# Patient Record
Sex: MALE | Race: BLACK OR AFRICAN AMERICAN | NOT HISPANIC OR LATINO | Employment: UNEMPLOYED | URBAN - METROPOLITAN AREA
[De-identification: names, ages, dates, MRNs, and addresses within clinical notes are randomized per-mention and may not be internally consistent; named-entity substitution may affect disease eponyms.]

---

## 2024-03-25 ENCOUNTER — OFFICE VISIT (OUTPATIENT)
Dept: FAMILY MEDICINE | Facility: CLINIC | Age: 29
End: 2024-03-25
Payer: COMMERCIAL

## 2024-03-25 VITALS
HEIGHT: 71 IN | HEART RATE: 76 BPM | DIASTOLIC BLOOD PRESSURE: 72 MMHG | WEIGHT: 167 LBS | SYSTOLIC BLOOD PRESSURE: 108 MMHG | BODY MASS INDEX: 23.38 KG/M2 | RESPIRATION RATE: 18 BRPM | OXYGEN SATURATION: 99 % | TEMPERATURE: 97.3 F

## 2024-03-25 DIAGNOSIS — M25.512 CHRONIC LEFT SHOULDER PAIN: ICD-10-CM

## 2024-03-25 DIAGNOSIS — G89.29 CHRONIC LEFT SHOULDER PAIN: ICD-10-CM

## 2024-03-25 DIAGNOSIS — M25.561 CHRONIC PAIN OF RIGHT KNEE: ICD-10-CM

## 2024-03-25 DIAGNOSIS — G89.29 CHRONIC PAIN OF RIGHT KNEE: ICD-10-CM

## 2024-03-25 DIAGNOSIS — L72.9 SKIN CYST: ICD-10-CM

## 2024-03-25 DIAGNOSIS — Z00.00 ROUTINE GENERAL MEDICAL EXAMINATION AT A HEALTH CARE FACILITY: Primary | ICD-10-CM

## 2024-03-25 LAB
ALBUMIN SERPL BCG-MCNC: 5 G/DL (ref 3.5–5.2)
ALP SERPL-CCNC: 73 U/L (ref 40–150)
ALT SERPL W P-5'-P-CCNC: 26 U/L (ref 0–70)
ANION GAP SERPL CALCULATED.3IONS-SCNC: 11 MMOL/L (ref 7–15)
AST SERPL W P-5'-P-CCNC: 31 U/L (ref 0–45)
BILIRUB SERPL-MCNC: 0.3 MG/DL
BUN SERPL-MCNC: 6.7 MG/DL (ref 6–20)
CALCIUM SERPL-MCNC: 9.8 MG/DL (ref 8.6–10)
CHLORIDE SERPL-SCNC: 101 MMOL/L (ref 98–107)
CHOLEST SERPL-MCNC: 192 MG/DL
CREAT SERPL-MCNC: 1.07 MG/DL (ref 0.67–1.17)
DEPRECATED HCO3 PLAS-SCNC: 27 MMOL/L (ref 22–29)
EGFRCR SERPLBLD CKD-EPI 2021: >90 ML/MIN/1.73M2
ERYTHROCYTE [DISTWIDTH] IN BLOOD BY AUTOMATED COUNT: 12.8 % (ref 10–15)
FASTING STATUS PATIENT QL REPORTED: YES
GLUCOSE SERPL-MCNC: 98 MG/DL (ref 70–99)
HBA1C MFR BLD: 5.3 % (ref 0–5.6)
HCT VFR BLD AUTO: 51.4 % (ref 40–53)
HDLC SERPL-MCNC: 66 MG/DL
HGB BLD-MCNC: 17 G/DL (ref 13.3–17.7)
LDLC SERPL CALC-MCNC: 108 MG/DL
MCH RBC QN AUTO: 29.5 PG (ref 26.5–33)
MCHC RBC AUTO-ENTMCNC: 33.1 G/DL (ref 31.5–36.5)
MCV RBC AUTO: 89 FL (ref 78–100)
NONHDLC SERPL-MCNC: 126 MG/DL
PLATELET # BLD AUTO: 194 10E3/UL (ref 150–450)
POTASSIUM SERPL-SCNC: 4.4 MMOL/L (ref 3.4–5.3)
PROT SERPL-MCNC: 7.7 G/DL (ref 6.4–8.3)
RBC # BLD AUTO: 5.77 10E6/UL (ref 4.4–5.9)
SODIUM SERPL-SCNC: 139 MMOL/L (ref 135–145)
TRIGL SERPL-MCNC: 92 MG/DL
TSH SERPL DL<=0.005 MIU/L-ACNC: 3.25 UIU/ML (ref 0.3–4.2)
VIT D+METAB SERPL-MCNC: 21 NG/ML (ref 20–50)
WBC # BLD AUTO: 6.5 10E3/UL (ref 4–11)

## 2024-03-25 PROCEDURE — 83036 HEMOGLOBIN GLYCOSYLATED A1C: CPT | Performed by: PHYSICIAN ASSISTANT

## 2024-03-25 PROCEDURE — 99385 PREV VISIT NEW AGE 18-39: CPT | Performed by: PHYSICIAN ASSISTANT

## 2024-03-25 PROCEDURE — 80053 COMPREHEN METABOLIC PANEL: CPT | Performed by: PHYSICIAN ASSISTANT

## 2024-03-25 PROCEDURE — 85027 COMPLETE CBC AUTOMATED: CPT | Performed by: PHYSICIAN ASSISTANT

## 2024-03-25 PROCEDURE — 99213 OFFICE O/P EST LOW 20 MIN: CPT | Mod: 25 | Performed by: PHYSICIAN ASSISTANT

## 2024-03-25 PROCEDURE — 80061 LIPID PANEL: CPT | Performed by: PHYSICIAN ASSISTANT

## 2024-03-25 PROCEDURE — 82306 VITAMIN D 25 HYDROXY: CPT | Performed by: PHYSICIAN ASSISTANT

## 2024-03-25 PROCEDURE — 36415 COLL VENOUS BLD VENIPUNCTURE: CPT | Performed by: PHYSICIAN ASSISTANT

## 2024-03-25 PROCEDURE — 84443 ASSAY THYROID STIM HORMONE: CPT | Performed by: PHYSICIAN ASSISTANT

## 2024-03-25 SDOH — HEALTH STABILITY: PHYSICAL HEALTH: ON AVERAGE, HOW MANY DAYS PER WEEK DO YOU ENGAGE IN MODERATE TO STRENUOUS EXERCISE (LIKE A BRISK WALK)?: 5 DAYS

## 2024-03-25 ASSESSMENT — SOCIAL DETERMINANTS OF HEALTH (SDOH): HOW OFTEN DO YOU GET TOGETHER WITH FRIENDS OR RELATIVES?: MORE THAN THREE TIMES A WEEK

## 2024-03-25 NOTE — COMMUNITY RESOURCES LIST (ENGLISH)
March 25, 2024           YOUR PERSONALIZED LIST OF SERVICES & PROGRAMS           & SHELTER    Housing      Free - Client Services  [address] [place] [address] (Distance: 16.8 miles)  Phone: [phone]) [phone]  Website: https://Common [employer]/  Language: English  Fee: Free  Transportation Options: Free transportation      [facility] [facility] half-way  Phone: [phone]) [phone]  Website: https://www.Epos.org/  Language: English      [facility] - Housing Stabilization Services  Phone: (362) 582-3424  Website: https://Genius/Housing-Stabilization.html  Language: English  Hours: Mon 9:00 AM - 5:00 PM Tue 9:00 AM - 5:00 PM Wed 9:00 AM - 5:00 PM Thu 9:00 AM - 5:00 PM Fri 9:00 AM - 5:00 PM  Fee: Insurance  Accessibility: Deaf or hard of hearing, Translation services    Case Management      Living - Housing Stabilization Services  [address] [place] [address] (Distance: 10.7 miles)  Phone: [phone]  Website: https://[employer]  Language: Yi, English, Cuban  Fee: Insurance, Self pay      [employer] - Housing Stabilization Services  Phone: (249) 836-7864  Website: https://homebasemn.com/  Language: English  Hours: Mon 8:00 AM - 4:00 PM Tue 8:00 AM - 4:00 PM Wed 8:00 AM - 4:00 PM Thu 8:00 AM - 4:00 PM Fri 8:00 AM - 4:00 PM  Fee: Free  Accessibility: Blind accommodation, Deaf or hard of hearing  Transportation Options: Free transportation      [employer] [facility] [employer] - Integrated Community Support Services (ICS) and Individualized Home Support (IHS)  Phone: (446) 673-3932  Website: https://www.[employer].org/  Language: Yi, English, Turkmen, Hmong, Cuban, Papua [place] Guinean  Hours: Mon 8:00 AM - 5:00 PM Tue 8:00 AM - 5:00 PM Wed 8:00 AM - 5:00 PM Thu 8:00 AM - 5:00 PM Fri 8:00 AM - 5:00 PM  Fee: Insurance  Accessibility: Blind accommodation, Deaf or hard of hearing, Translation services    Drop-In Services      [place] [address] (Distance: 2.2 miles)  Website: https://www.[employer].org/housing  Language: English  Fee: Free, Self pay      [facility] Library - Warming or cooling center - [facility] - [address]  [place] [address] (Distance: 1.8 miles)  Phone: [phone]) [phone]  Language: English, Turkmen, [place], Canadian, Central African, Estonian  Fee: Free      [facility]OVE - LAUNDRY LOVE  Website: http://www.laundrylove.org               IMPORTANT NUMBERS & WEBSITES        Emergency Services  911  .   United Way  211 http://211unitedway.org  .   Poison Control  (905) 992-8744 http://[place]poison.org http://wis[place]poison.org  .     Suicide and Crisis Lifeline  988 http://988Puddleline.org  .   Childhelp [place] Child Abuse Hotline  384.246.5944 http://Childhelphotline.org   .   [place] Sexual Assault Hotline  (901) 717-6188 (HOPE) http://TyRx Pharman.org   .     National Runaway Safeline  (967) 282-4781 (RUNAWAY) http://NellOne TherapeuticsruSafeBoot.org  .   Pregnancy & Postpartum Support  Call/text 550-957-8813  [place]: http://ppsupport[place].org  [place]: http://UsabilityTools.com.com/wi  .   Substance Abuse National Helpline ([facility])  898-438-HELP (3044) http://Findtreatment.gov   .                DISCLAIMER: [employer] does not endorse any service providers mentioned in this resource list. [employer] does not guarantee that the services mentioned in this resource list will be available to you or will improve your health or wellness.    [facility]

## 2024-03-25 NOTE — PROGRESS NOTES
Preventive Care Visit  [facility] PA-C, Internal Medicine  Mar 25, 2024      Assessment & Plan       ICD-10-CM    1. Routine general medical examination at a health care facility  Z00.00 Hemoglobin A1c     Comprehensive metabolic panel     CBC with platelets     Lipid panel reflex to direct LDL Non-fasting     Vitamin D Deficiency     TSH with free T4 reflex     Hemoglobin A1c     Comprehensive metabolic panel     CBC with platelets     Lipid panel reflex to direct LDL Non-fasting     Vitamin D Deficiency     TSH with free T4 reflex      2. Chronic pain of right knee  M25.561 Orthopedic  Referral    G89.29       3. Chronic left shoulder pain  M25.512 Physical Therapy  Referral    G89.29       4. Skin cyst  L72.9         - Knee exam with evidence of ligamentous derangement, referral placed to orthopedics for further evaluation.  - Suspect MSK strain causing shoulder pain, referral placed to PT for further evaluation.  - Skin cyst of mid upper back. Reassured patient of benign condition. Elects to continue watchful waiting.      Counseling  Appropriate preventive services were discussed with this patient, including applicable screening as appropriate for fall prevention, nutrition, physical activity, Tobacco-use cessation, weight loss and cognition.  Checklist reviewing preventive services available has been given to the patient.  Reviewed patient's diet, addressing concerns and/or questions.           Jean Rincon is a 28 year old, presenting for the following:  Physical        3/25/2024     2:05 PM   Additional Questions   Roomed by Haleigh ALLEN        Health Care Directive  Patient does not have a Health Care Directive or Living Will:     HPI    -C/o Lt posterior shoulder pain x5 months. Another player pulled his shoulder backward while playing soccer, resulting in ongoing pain. He usually feels it with external rotation and full abduction or  "adduction.    -Hurt his Rt knee about 5 years ago while playing soccer. Felt like his knee slipped out of place. He felt immediate pain. Was seen at outside clinic, XR was checked and was normal per patient; they recommended \"massage\" to treat the injury. Since then notes ongoing pain on lateral and medial aspects of knee when walking, in full flexion, or crossing Rt leg over Lt.    - Has skin lesion on upper mid back he would like checked.          3/25/2024   General Health   How would you rate your overall physical health? (!) FAIR   Feel stress (tense, anxious, or unable to sleep) Not at all         3/25/2024   Nutrition   Three or more servings of calcium each day? Yes   Diet: Regular (no restrictions)   How many servings of fruit and vegetables per day? (!) 2-3   How many sweetened beverages each day? (!) 2         3/25/2024   Exercise   Days per week of moderate/strenous exercise 5 days         3/25/2024   Social Factors   Frequency of gathering with friends or relatives More than three times a week   Worry food won't last until get money to buy more No   Food not last or not have enough money for food? No   Do you have housing?  No   Are you worried about losing your housing? No   Lack of transportation? No   Unable to get utilities (heat,electricity)? No   Want help with housing or utility concern? No   (!) HOUSING CONCERN PRESENT      3/25/2024   Dental   Dentist two times every year? Yes         3/25/2024   TB Screening   Were you born outside of the [place]? Yes         Today's PHQ-2 Score:       3/25/2024     1:48 PM   PHQ-2 ( 1999 [employer])   Q1: Little interest or pleasure in doing things 0   Q2: Feeling down, depressed or hopeless 0   PHQ-2 Score 0   Q1: Little interest or pleasure in doing things Not at all   Q2: Feeling down, depressed or hopeless Not at all   PHQ-2 Score 0           3/25/2024   Substance Use   Alcohol more than 3/day or more than 7/wk Not Applicable   Do you use any other substances " "recreationally? No     Social History     Tobacco Use    Smoking status: Never    Smokeless tobacco: Never           3/25/2024   STI Screening   New sexual partner(s) since last STI/HIV test? No         3/25/2024   Contraception/Family Planning   Questions about contraception or family planning No        Reviewed and updated as needed this visit by Provider                          Review of Systems  Constitutional, HEENT, cardiovascular, pulmonary, GI, , musculoskeletal, neuro, skin, endocrine and psych systems are negative, except as otherwise noted.     Objective    Exam  /72   Pulse 76   Temp 97.3  F (36.3  C)   Resp 18   Ht 1.803 m (5' 11\")   Wt 75.8 kg (167 lb)   SpO2 99%   BMI 23.29 kg/m     Estimated body mass index is 23.29 kg/m  as calculated from the following:    Height as of this encounter: 1.803 m (5' 11\").    Weight as of this encounter: 75.8 kg (167 lb).    Physical Exam  Musculoskeletal:      Right knee: No effusion.      Instability Tests: Medial Nighat test negative and lateral Nigaht test negative.       GENERAL: alert and no distress  EYES: Eyes grossly normal to inspection, PERRL and conjunctivae and sclerae normal  HENT: ear canals and TM's normal, nose and mouth without ulcers or lesions  NECK: no adenopathy, no asymmetry, masses, or scars  RESP: lungs clear to auscultation - no rales, rhonchi or wheezes  CV: regular rate and rhythm, normal S1 S2, no S3 or S4, no murmur, click or rub, no peripheral edema  ABDOMEN: soft, nontender, no hepatosplenomegaly, no masses and bowel sounds normal  MS: no gross musculoskeletal defects noted, no edema  SKIN: no suspicious lesions or rashes. Discrete, mobile cyst of mid thoracic back, size of a grape.  NEURO: Normal strength and tone, mentation intact and speech normal  PSYCH: mentation appears normal, affect normal/bright  Right Knee Exam     Tenderness   The patient is experiencing tenderness in the medial joint line.    Range of " Motion   The patient has normal right knee ROM.    Tests   Nighat:  Medial - negative Lateral - negative  Varus: negative Valgus: positive  Patellar apprehension: positive    Other   Sensation: normal  Swelling: none  Effusion: no effusion present      Left Shoulder Exam     Tenderness   The patient is experiencing no tenderness.     Range of Motion   The patient has normal left shoulder ROM.    Muscle Strength   The patient has normal left shoulder strength.    Tests   Apprehension: negative  Impingement: negative    Other   Sensation: normal               Signed Electronically by: Amparo Byers PA-C

## 2024-03-25 NOTE — LETTER
March 27, 2024      Sergey Robison  [address]   [place] [address]        Dear ,    We are writing to inform you of your test results.    - Your total cholesterol is normal (< 200), - LDL (bad cholesterol) is normal (<130), - HDL (good cholesterol) is normal, - Triglycerides are normal (<150)  - Your metabolic panel shows:  normal electrolytes (sodium, potassium, calcium) normal kidney function (creatinine and GFR) normal liver function (AST/ALT)  - Hemoglobin A1c is a test shows your blood sugar level over the last 2-3 months. A normal result for someone who does not have diabetes is 4-5.6% (fasting blood sugar <100).  - Your TSH, a screening test for thyroid disease, was normal.  - Your Vitamin D level is normal.  - Your Blood Count Results show normal White Blood Cell count (no sign of infection), normal Hemoglobin (not anemia), and normal Platelets (affects clotting).    Results for orders placed or performed in visit on 03/25/24   Hemoglobin A1c     Status: Normal   Result Value Ref Range    Hemoglobin A1C 5.3 0.0 - 5.6 %   Comprehensive metabolic panel     Status: Normal   Result Value Ref Range    Sodium 139 135 - 145 mmol/L    Potassium 4.4 3.4 - 5.3 mmol/L    Carbon Dioxide (CO2) 27 22 - 29 mmol/L    Anion Gap 11 7 - 15 mmol/L    Urea Nitrogen 6.7 6.0 - 20.0 mg/dL    Creatinine 1.07 0.67 - 1.17 mg/dL    GFR Estimate >90 >60 mL/min/1.73m2    Calcium 9.8 8.6 - 10.0 mg/dL    Chloride 101 98 - 107 mmol/L    Glucose 98 70 - 99 mg/dL    Alkaline Phosphatase 73 40 - 150 U/L    AST 31 0 - 45 U/L    ALT 26 0 - 70 U/L    Protein Total 7.7 6.4 - 8.3 g/dL    Albumin 5.0 3.5 - 5.2 g/dL    Bilirubin Total 0.3 <=1.2 mg/dL   CBC with platelets     Status: Normal   Result Value Ref Range    WBC Count 6.5 4.0 - 11.0 10e3/uL    RBC Count 5.77 4.40 - 5.90 10e6/uL    Hemoglobin 17.0 13.3 - 17.7 g/dL    Hematocrit 51.4 40.0 - 53.0 %    MCV 89 78 - 100 fL    MCH 29.5 26.5 - 33.0 pg    MCHC 33.1 31.5 -  36.5 g/dL    RDW 12.8 10.0 - 15.0 %    Platelet Count 194 150 - 450 10e3/uL   Lipid panel reflex to direct LDL Non-fasting     Status: Abnormal   Result Value Ref Range    Cholesterol 192 <200 mg/dL    Triglycerides 92 <150 mg/dL    Direct Measure HDL 66 >=40 mg/dL    LDL Cholesterol Calculated 108 (H) <=100 mg/dL    Non HDL Cholesterol 126 <130 mg/dL    Patient Fasting > 8hrs? Yes    Vitamin D Deficiency     Status: Normal   Result Value Ref Range    Vitamin D, Total (25-Hydroxy) 21 20 - 50 ng/mL   TSH with free T4 reflex     Status: Normal   Result Value Ref Range    TSH 3.25 0.30 - 4.20 uIU/mL     Thank you for choosing [facility] [place].  We appreciate the opportunity to serve you and look forward to supporting your healthcare needs in the future.    If you have any questions or concerns, please call me or my staff at [phone].      Sincerely,        Amparo Byers PA-C

## 2024-03-25 NOTE — PATIENT INSTRUCTIONS
Preventive Care Advice   This is general advice given by our system to help you stay healthy. However, your care team may have specific advice just for you. Please talk to your care team about your preventive care needs.  Nutrition  Eat 5 or more servings of fruits and vegetables each day.  Try wheat bread, brown rice and whole grain pasta (instead of white bread, rice, and pasta).  Get enough calcium and vitamin D. Check the label on foods and aim for 100% of the RDA (recommended daily allowance).  Lifestyle  Exercise at least 150 minutes each week   (30 minutes a day, 5 days a week).  Do muscle strengthening activities 2 days a week. These help control your weight and prevent disease.  No smoking.  Wear sunscreen to prevent skin cancer.  Have a dental exam and cleaning every 6 months.  Yearly exams  See your health care team every year to talk about:  Any changes in your health.  Any medicines your care team has prescribed.  Preventive care, family planning, and ways to prevent chronic diseases.  Shots (vaccines)   HPV shots (up to age 26), if you've never had them before.  Hepatitis B shots (up to age 59), if you've never had them before.  COVID-19 shot: Get this shot when it's due.  Flu shot: Get a flu shot every year.  Tetanus shot: Get a tetanus shot every 10 years.  Pneumococcal, hepatitis A, and RSV shots: Ask your care team if you need these based on your risk.  Shingles shot (for age 50 and up).  General health tests  Diabetes screening:  Starting at age 35, Get screened for diabetes at least every 3 years.  If you are younger than age 35, ask your care team if you should be screened for diabetes.  Cholesterol test: At age 39, start having a cholesterol test every 5 years, or more often if advised.  Bone density scan (DEXA): At age 50, ask your care team if you should have this scan for osteoporosis (brittle bones).  Hepatitis C: Get tested at least once in your life.  STIs (sexually transmitted  infections)  Before age 24: Ask your care team if you should be screened for STIs.  After age 24: Get screened for STIs if you're at risk. You are at risk for STIs (including HIV) if:  You are sexually active with more than one person.  You don't use condoms every time.  You or a partner was diagnosed with a sexually transmitted infection.  If you are at risk for HIV, ask about PrEP medicine to prevent HIV.  Get tested for HIV at least once in your life, whether you are at risk for HIV or not.  Cancer screening tests  Cervical cancer screening: If you have a cervix, begin getting regular cervical cancer screening tests at age 21. Most people who have regular screenings with normal results can stop after age 65. Talk about this with your provider.  Breast cancer scan (mammogram): If you've ever had breasts, begin having regular mammograms starting at age 40. This is a scan to check for breast cancer.  Colon cancer screening: It is important to start screening for colon cancer at age 45.  Have a colonoscopy test every 10 years (or more often if you're at risk) Or, ask your provider about stool tests like a FIT test every year or Cologuard test every 3 years.  To learn more about your testing options, visit: https://www.Klood/835060.pdf.  For help making a decision, visit: https://bit.ly/if64049.  Prostate cancer screening test: If you have a prostate and are age 55 to 69, ask your provider if you would benefit from a yearly prostate cancer screening test.  Lung cancer screening: If you are a current or former smoker age 50 to 80, ask your care team if ongoing lung cancer screenings are right for you.  For informational purposes only. Not to replace the advice of your health care provider. Copyright   2023 [employer]. All rights reserved. Clinically reviewed by the [facility] Transitions Program. [employer] [id number] - REV 01/24.

## 2024-04-02 ENCOUNTER — THERAPY VISIT (OUTPATIENT)
Dept: PHYSICAL THERAPY | Facility: CLINIC | Age: 29
End: 2024-04-02
Attending: PHYSICIAN ASSISTANT
Payer: COMMERCIAL

## 2024-04-02 DIAGNOSIS — G89.29 CHRONIC LEFT SHOULDER PAIN: ICD-10-CM

## 2024-04-02 DIAGNOSIS — M25.512 CHRONIC LEFT SHOULDER PAIN: ICD-10-CM

## 2024-04-02 PROCEDURE — 97161 PT EVAL LOW COMPLEX 20 MIN: CPT | Mod: GP | Performed by: PHYSICAL THERAPIST

## 2024-04-02 PROCEDURE — 97112 NEUROMUSCULAR REEDUCATION: CPT | Mod: GP | Performed by: PHYSICAL THERAPIST

## 2024-04-02 NOTE — PROGRESS NOTES
PHYSICAL THERAPY EVALUATION  Type of Visit: Evaluation  Patient reports onset of L shoulder pain 2023 when someone ran into him while he was playing soccer.  He had significant pain initially which has improved some but has not resolved.    See electronic medical record for Abuse and Falls Screening details.    Subjective       Presenting condition or subjective complaint: L shoulder problem  Date of onset: 23    Relevant medical history: -- (none) none  Dates & types of surgery: nonenone    Prior diagnostic imaging/testing results: -- (none)     Prior therapy history for the same diagnosis, illness or injury: No      Prior Level of Function  Transfers: Independent  Ambulation: Independent  ADL: Independent  IADL:     Living Environment  Social support: With family members   Type of home: Apartment/condo   Stairs to enter the home: Yes 3 Is there a railing: Yes   Ramp: No   Stairs inside the home: No       Help at home: None  Equipment owned: -- (none)     Employment: No    Hobbies/Interests: soccer    Patient goals for therapy: lifting    Pain assessment: Location: L posterior shoulder, superior scapula/Ratin-9/10   Patient is Left-hand dominant  Objective   SHOULDER EVALUATION  PAIN: Pain Level at Rest: 0/10  Pain Level with Use: 9/10  Pain Location: L posterior shoulder/superior scapula  Pain Quality: Sharp  Pain Frequency: intermittent  Pain is Worst: no difference time of day  Pain is Exacerbated By: lifting --10# with L hand--6/10 pain, reaching up sometimes, lying on the L side, reaching behind his back, pushing, pain with weightlifting  Pain is Relieved By: avoiding aggravators  Pain Progression: since onset improved  INTEGUMENTARY (edema, incisions):   POSTURE: rounded shoulders, slight fwd head  GAIT:   Weightbearing Status:   Assistive Device(s):   Gait Deviations:   BALANCE/PROPRIOCEPTION:   WEIGHTBEARING ALIGNMENT:   ROM: L shoulder AROM:  flexion, abduction nil loss, ERP flexion, NE  "abduction; IR/ext to T8, \"stiff\", ER to back of neck with mild pain, extension nil loss, NE.     STRENGTH: L shoulder strength:  flexion 5/5, no pain; abduction 4+/5, mild pain; IR 5/5 and ER 5/5 both with mild pain; scaption L 5-/5 with pain  FLEXIBILITY:   SPECIAL TESTS:   PALPATION:   JOINT MOBILITY:   CERVICAL SCREEN:     Assessment & Plan   CLINICAL IMPRESSIONS  Medical Diagnosis: Chronic left shoulder pain    Treatment Diagnosis: L shoulder pain   Impression/Assessment: Patient is a 28 year old male with L shoulder complaints.  The following significant findings have been identified: Pain, Decreased strength, Decreased activity tolerance, and Impaired posture. These impairments interfere with their ability to perform self care tasks, recreational activities, and household chores as compared to previous level of function.     Clinical Decision Making (Complexity):  Clinical Presentation: Stable/Uncomplicated  Clinical Presentation Rationale: based on medical and personal factors listed in PT evaluation  Clinical Decision Making (Complexity): Low complexity    PLAN OF CARE  Treatment Interventions:  Interventions: Manual Therapy, Neuromuscular Re-education, Therapeutic Activity, Therapeutic Exercise, Self-Care/Home Management    Long Term Goals     PT Goal 1  Goal Identifier: Lifting  Goal Description: Patient will be able to lift 10# with L UE without pain  Rationale: to maximize safety and independence with performance of ADLs and functional tasks;to maximize safety and independence within the home  Goal Progress: Currently has 6/10 pain with this  Target Date: 05/14/24      Frequency of Treatment: 1x/week  Duration of Treatment: 6 weeks    Recommended Referrals to Other Professionals: none  Education Assessment:   Learner/Method: Patient;Listening;Reading;Demonstration;Pictures/Video;No Barriers to Learning    Risks and benefits of evaluation/treatment have been explained.   Patient/Family/caregiver agrees " with Plan of Care.     Evaluation Time:     PT Eval, Low Complexity Minutes (55176): 20       Signing Clinician: HINA Fernandez [facility]                                                                                   OUTPATIENT PHYSICAL THERAPY      PLAN OF TREATMENT FOR OUTPATIENT REHABILITATION   Patient's Last Name, First Name, TKSergey Nicole YOB: 1995   Provider's Name   [facility]   Medical Record No.  [id number]     Onset Date: 12/02/23  Start of Care Date: 04/02/24     Medical Diagnosis:  Chronic left shoulder pain      PT Treatment Diagnosis:  L shoulder pain Plan of Treatment  Frequency/Duration: 1x/week/ 6 weeks    Certification date from 04/02/24 to 05/14/24         See note for plan of treatment details and functional goals     Hayde Harvey PT                         I CERTIFY THE NEED FOR THESE SERVICES FURNISHED UNDER        THIS PLAN OF TREATMENT AND WHILE UNDER MY CARE     (Physician attestation of this document indicates review and certification of the therapy plan).              Referring Provider:  Amparo Byers    Initial Assessment  See Epic Evaluation- Start of Care Date: 04/02/24

## 2024-07-01 ENCOUNTER — HOSPITAL ENCOUNTER (EMERGENCY)
Facility: CLINIC | Age: 29
End: 2024-07-01
Payer: COMMERCIAL